# Patient Record
Sex: MALE | Race: WHITE | Employment: UNEMPLOYED | URBAN - METROPOLITAN AREA
[De-identification: names, ages, dates, MRNs, and addresses within clinical notes are randomized per-mention and may not be internally consistent; named-entity substitution may affect disease eponyms.]

---

## 2017-11-21 ENCOUNTER — HOSPITAL ENCOUNTER (OUTPATIENT)
Dept: GENERAL RADIOLOGY | Age: 82
Discharge: HOME | End: 2017-11-21
Attending: FAMILY MEDICINE | Admitting: FAMILY MEDICINE

## 2017-11-21 DIAGNOSIS — M54.6 CHRONIC MIDLINE THORACIC BACK PAIN: ICD-10-CM

## 2017-11-21 DIAGNOSIS — G89.29 CHRONIC MIDLINE THORACIC BACK PAIN: ICD-10-CM

## 2018-03-02 PROBLEM — K81.0 ACUTE CHOLECYSTITIS: Status: ACTIVE | Noted: 2018-03-02

## 2018-04-02 PROBLEM — Z91.81 RISK FOR FALLS: Status: ACTIVE | Noted: 2018-04-02

## 2018-04-02 PROBLEM — E86.0 DEHYDRATION WITH HYPONATREMIA: Status: ACTIVE | Noted: 2018-04-02

## 2018-04-02 PROBLEM — E87.1 DEHYDRATION WITH HYPONATREMIA: Status: ACTIVE | Noted: 2018-04-02

## 2018-04-02 PROBLEM — R63.0 ANOREXIA: Status: ACTIVE | Noted: 2018-04-02

## 2018-04-02 PROBLEM — T81.49XA POSTOPERATIVE ABSCESS: Status: ACTIVE | Noted: 2018-04-02

## 2018-04-03 PROBLEM — E43 SEVERE MALNUTRITION (HCC): Chronic | Status: ACTIVE | Noted: 2018-04-03

## 2019-07-07 ENCOUNTER — APPOINTMENT (OUTPATIENT)
Dept: CT IMAGING | Age: 84
End: 2019-07-07
Payer: MEDICAID

## 2019-07-07 ENCOUNTER — HOSPITAL ENCOUNTER (EMERGENCY)
Age: 84
Discharge: HOME | End: 2019-07-07
Payer: MEDICAID

## 2019-07-07 ENCOUNTER — APPOINTMENT (OUTPATIENT)
Dept: GENERAL RADIOLOGY | Age: 84
End: 2019-07-07
Payer: MEDICAID

## 2019-07-07 VITALS
OXYGEN SATURATION: 100 % | RESPIRATION RATE: 16 BRPM | HEART RATE: 78 BPM | DIASTOLIC BLOOD PRESSURE: 89 MMHG | TEMPERATURE: 98.8 F | SYSTOLIC BLOOD PRESSURE: 143 MMHG

## 2019-07-07 DIAGNOSIS — E16.2 HYPOGLYCEMIA: Primary | ICD-10-CM

## 2019-07-07 LAB
ALBUMIN SERPL-MCNC: 4.5 GM/DL (ref 3.4–5)
ALP BLD-CCNC: 68 IU/L (ref 40–129)
ALT SERPL-CCNC: 40 U/L (ref 10–40)
ANION GAP SERPL CALCULATED.3IONS-SCNC: 10 MMOL/L (ref 4–16)
AST SERPL-CCNC: 34 IU/L (ref 15–37)
BASOPHILS ABSOLUTE: 0 K/CU MM
BASOPHILS RELATIVE PERCENT: 0.4 % (ref 0–1)
BILIRUB SERPL-MCNC: 0.3 MG/DL (ref 0–1)
BUN BLDV-MCNC: 23 MG/DL (ref 6–23)
CALCIUM SERPL-MCNC: 9.9 MG/DL (ref 8.3–10.6)
CHLORIDE BLD-SCNC: 105 MMOL/L (ref 99–110)
CHP ED QC CHECK: YES
CO2: 27 MMOL/L (ref 21–32)
CREAT SERPL-MCNC: 1.1 MG/DL (ref 0.9–1.3)
DIFFERENTIAL TYPE: ABNORMAL
EOSINOPHILS ABSOLUTE: 0.4 K/CU MM
EOSINOPHILS RELATIVE PERCENT: 3.9 % (ref 0–3)
GFR AFRICAN AMERICAN: >60 ML/MIN/1.73M2
GFR NON-AFRICAN AMERICAN: >60 ML/MIN/1.73M2
GLUCOSE BLD-MCNC: 100 MG/DL (ref 70–99)
GLUCOSE BLD-MCNC: 123 MG/DL (ref 70–99)
GLUCOSE BLD-MCNC: 147 MG/DL (ref 70–99)
GLUCOSE BLD-MCNC: 51 MG/DL (ref 70–99)
GLUCOSE BLD-MCNC: 78 MG/DL
GLUCOSE BLD-MCNC: 78 MG/DL (ref 70–99)
HCT VFR BLD CALC: 39 % (ref 42–52)
HEMOGLOBIN: 13 GM/DL (ref 13.5–18)
IMMATURE NEUTROPHIL %: 0.4 % (ref 0–0.43)
LYMPHOCYTES ABSOLUTE: 2.6 K/CU MM
LYMPHOCYTES RELATIVE PERCENT: 28.2 % (ref 24–44)
MCH RBC QN AUTO: 31.6 PG (ref 27–31)
MCHC RBC AUTO-ENTMCNC: 33.3 % (ref 32–36)
MCV RBC AUTO: 94.7 FL (ref 78–100)
MONOCYTES ABSOLUTE: 0.8 K/CU MM
MONOCYTES RELATIVE PERCENT: 8.3 % (ref 0–4)
NUCLEATED RBC %: 0 %
PDW BLD-RTO: 12 % (ref 11.7–14.9)
PLATELET # BLD: 162 K/CU MM (ref 140–440)
PMV BLD AUTO: 9.6 FL (ref 7.5–11.1)
POTASSIUM SERPL-SCNC: 4.7 MMOL/L (ref 3.5–5.1)
PRO-BNP: 825.3 PG/ML
RBC # BLD: 4.12 M/CU MM (ref 4.6–6.2)
SEGMENTED NEUTROPHILS ABSOLUTE COUNT: 5.5 K/CU MM
SEGMENTED NEUTROPHILS RELATIVE PERCENT: 58.8 % (ref 36–66)
SODIUM BLD-SCNC: 142 MMOL/L (ref 135–145)
TOTAL IMMATURE NEUTOROPHIL: 0.04 K/CU MM
TOTAL NUCLEATED RBC: 0 K/CU MM
TOTAL PROTEIN: 7.7 GM/DL (ref 6.4–8.2)
TROPONIN T: <0.01 NG/ML
WBC # BLD: 9.4 K/CU MM (ref 4–10.5)

## 2019-07-07 PROCEDURE — 71045 X-RAY EXAM CHEST 1 VIEW: CPT

## 2019-07-07 PROCEDURE — 2580000003 HC RX 258

## 2019-07-07 PROCEDURE — 70450 CT HEAD/BRAIN W/O DYE: CPT

## 2019-07-07 PROCEDURE — 84484 ASSAY OF TROPONIN QUANT: CPT

## 2019-07-07 PROCEDURE — 83880 ASSAY OF NATRIURETIC PEPTIDE: CPT

## 2019-07-07 PROCEDURE — 82962 GLUCOSE BLOOD TEST: CPT

## 2019-07-07 PROCEDURE — 80053 COMPREHEN METABOLIC PANEL: CPT

## 2019-07-07 PROCEDURE — 85025 COMPLETE CBC W/AUTO DIFF WBC: CPT

## 2019-07-07 PROCEDURE — 99285 EMERGENCY DEPT VISIT HI MDM: CPT

## 2019-07-07 PROCEDURE — 93005 ELECTROCARDIOGRAM TRACING: CPT | Performed by: PHYSICIAN ASSISTANT

## 2019-07-07 RX ORDER — DEXTROSE MONOHYDRATE 25 G/50ML
INJECTION, SOLUTION INTRAVENOUS
Status: COMPLETED
Start: 2019-07-07 | End: 2019-07-07

## 2019-07-07 RX ADMIN — DEXTROSE 50 % IN WATER (D50W) INTRAVENOUS SYRINGE 50 ML: at 18:34

## 2019-07-07 NOTE — ED PROVIDER NOTES
Refill    carvedilol (COREG) 6.25 MG tablet Take 1 tablet by mouth 2 times daily (with meals) 60 tablet 3    insulin glargine (LANTUS) 100 UNIT/ML injection vial Inject 15 Units into the skin nightly 1 vial 0    insulin lispro (HUMALOG) 100 UNIT/ML injection vial Inject 0-18 Units into the skin 3 times daily (with meals) 1 vial 0    insulin lispro (HUMALOG) 100 UNIT/ML injection vial Inject 0-9 Units into the skin nightly 1 vial 0    FREESTYLE LITE strip   0    FREESTYLE LANCETS MISC TEST UP TO three times a day subcutaneously  0    polyethylene glycol (GLYCOLAX) powder take 17GM (DISSOLVED IN WATER) by mouth once daily if needed for constipation  0    risperiDONE (RISPERDAL) 0.25 MG tablet Take 0.25 mg by mouth nightly   0    sertraline (ZOLOFT) 25 MG tablet take 1 tablet by mouth once daily  0    acetaminophen (TYLENOL) 325 MG tablet Take 2 tablets by mouth every 6 hours as needed for Pain 120 tablet 3    docusate sodium (COLACE) 100 MG capsule Take 1 capsule by mouth 2 times daily as needed for Constipation 30 capsule 0    atorvastatin (LIPITOR) 10 MG tablet Take 1 tablet by mouth daily  0    FOLTABS 800 800- MCG-MG-MCG TABS Take 1 tablet by mouth daily  0    aspirin 81 MG chewable tablet Take 1 tablet by mouth daily 30 tablet 0    omeprazole (PRILOSEC) 20 MG capsule Take 20 mg by mouth Daily         ALLERGIES    No Known Allergies    FAMILY HISTORY    Family History   Problem Relation Age of Onset    Stroke Mother     Cancer Father     Cancer Sister        SOCIAL HISTORY    Social History     Socioeconomic History    Marital status:       Spouse name: None    Number of children: 0    Years of education: None    Highest education level: None   Occupational History    None   Social Needs    Financial resource strain: None    Food insecurity:     Worry: None     Inability: None    Transportation needs:     Medical: None     Non-medical: None   Tobacco Use    Smoking status: Abnormal; Notable for the following components:    Pro-.3 (*)     All other components within normal limits   POCT GLUCOSE - Abnormal; Notable for the following components:    POC Glucose 51 (*)     All other components within normal limits   POCT GLUCOSE - Abnormal; Notable for the following components:    POC Glucose 100 (*)     All other components within normal limits   POCT GLUCOSE - Abnormal; Notable for the following components:    POC Glucose 147 (*)     All other components within normal limits   POCT GLUCOSE - Normal   TROPONIN   POCT GLUCOSE     Ct Head Wo Contrast    Result Date: 7/7/2019  EXAMINATION: CT OF THE HEAD WITHOUT CONTRAST  7/7/2019 7:53 pm TECHNIQUE: CT of the head was performed without the administration of intravenous contrast. Dose modulation, iterative reconstruction, and/or weight based adjustment of the mA/kV was utilized to reduce the radiation dose to as low as reasonably achievable. COMPARISON: October 2016 HISTORY: ORDERING SYSTEM PROVIDED HISTORY: ams TECHNOLOGIST PROVIDED HISTORY: Has a \"code stroke\" or \"stroke alert\" been called? ->No Reason for Exam: low blood sugar FINDINGS: BRAIN/VENTRICLES: Ventricles and sulci are not grossly changed. There is no midline shift. Vascular calcifications are noted. Vascular tortuosity is noted. Minimal hazy low-density in the white matter is noted suggesting minimal small-vessel ischemic change. Otherwise, there is no focal area of abnormal density. There is no midline shift, hemorrhage or extra-axial collection ORBITS: No acute orbital abnormality is noted SINUSES: Minimal paranasal sinus mucosal thickening is noted SOFT TISSUES/SKULL:  No acute abnormality of the visualized skull or soft tissues. No acute intracranial abnormality. Xr Chest Portable    Result Date: 7/7/2019  EXAMINATION: ONE XRAY VIEW OF THE CHEST 7/7/2019 6:39 pm COMPARISON: Chest radiograph performed 04/02/2018.  HISTORY: ORDERING SYSTEM PROVIDED HISTORY:

## 2019-07-07 NOTE — ED NOTES
Bed: ED-34  Expected date:   Expected time:   Means of arrival:   Comments:  [address] [place], RN  07/07/19 9290

## 2019-07-08 PROCEDURE — 93010 ELECTROCARDIOGRAM REPORT: CPT | Performed by: INTERNAL MEDICINE

## 2019-07-11 LAB
EKG ATRIAL RATE: 60 BPM
EKG DIAGNOSIS: NORMAL
EKG P AXIS: 55 DEGREES
EKG P-R INTERVAL: 144 MS
EKG Q-T INTERVAL: 380 MS
EKG QRS DURATION: 90 MS
EKG QTC CALCULATION (BAZETT): 380 MS
EKG R AXIS: 3 DEGREES
EKG T AXIS: 71 DEGREES
EKG VENTRICULAR RATE: 60 BPM

## 2021-02-09 ENCOUNTER — APPOINTMENT (OUTPATIENT)
Dept: CT IMAGING | Age: 86
End: 2021-02-09
Payer: MEDICAID

## 2021-02-09 ENCOUNTER — APPOINTMENT (OUTPATIENT)
Dept: GENERAL RADIOLOGY | Age: 86
End: 2021-02-09
Payer: MEDICAID

## 2021-02-09 ENCOUNTER — HOSPITAL ENCOUNTER (EMERGENCY)
Age: 86
Discharge: NURSING, REHAB OR LONG-TERM CARE | End: 2021-02-09
Payer: MEDICAID

## 2021-02-09 VITALS
DIASTOLIC BLOOD PRESSURE: 66 MMHG | TEMPERATURE: 98.5 F | HEART RATE: 87 BPM | WEIGHT: 137 LBS | RESPIRATION RATE: 16 BRPM | SYSTOLIC BLOOD PRESSURE: 157 MMHG | HEIGHT: 60 IN | OXYGEN SATURATION: 96 % | BODY MASS INDEX: 26.9 KG/M2

## 2021-02-09 DIAGNOSIS — M79.601 BILATERAL ARM PAIN: ICD-10-CM

## 2021-02-09 DIAGNOSIS — S00.83XA FACIAL HEMATOMA, INITIAL ENCOUNTER: ICD-10-CM

## 2021-02-09 DIAGNOSIS — S00.12XA PERIORBITAL ECCHYMOSIS, LEFT, INITIAL ENCOUNTER: ICD-10-CM
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
## 2021-02-09 DIAGNOSIS — R03.0 ELEVATED BLOOD PRESSURE READING: ICD-10-CM

## 2021-02-09 DIAGNOSIS — M79.602 BILATERAL ARM PAIN: ICD-10-CM

## 2021-02-09 DIAGNOSIS — S09.90XA CLOSED HEAD INJURY, INITIAL ENCOUNTER: Primary | ICD-10-CM

## 2021-02-09 DIAGNOSIS — S00.81XA FACIAL ABRASION, INITIAL ENCOUNTER: ICD-10-CM

## 2021-02-09 PROCEDURE — 73090 X-RAY EXAM OF FOREARM: CPT

## 2021-02-09 PROCEDURE — 99285 EMERGENCY DEPT VISIT HI MDM: CPT

## 2021-02-09 PROCEDURE — 70450 CT HEAD/BRAIN W/O DYE: CPT

## 2021-02-09 PROCEDURE — 6370000000 HC RX 637 (ALT 250 FOR IP): Performed by: PHYSICIAN ASSISTANT

## 2021-02-09 PROCEDURE — 70486 CT MAXILLOFACIAL W/O DYE: CPT

## 2021-02-09 PROCEDURE — 72125 CT NECK SPINE W/O DYE: CPT

## 2021-02-09 PROCEDURE — 73060 X-RAY EXAM OF HUMERUS: CPT

## 2021-02-09 PROCEDURE — 6360000002 HC RX W HCPCS: Performed by: PHYSICIAN ASSISTANT

## 2021-02-09 PROCEDURE — 90715 TDAP VACCINE 7 YRS/> IM: CPT | Performed by: PHYSICIAN ASSISTANT

## 2021-02-09 PROCEDURE — 90471 IMMUNIZATION ADMIN: CPT | Performed by: PHYSICIAN ASSISTANT

## 2021-02-09 RX ORDER — DIAPER,BRIEF,INFANT-TODD,DISP
EACH MISCELLANEOUS ONCE
Status: COMPLETED | OUTPATIENT
Start: 2021-02-09 | End: 2021-02-09

## 2021-02-09 RX ORDER — ACETAMINOPHEN 500 MG
500 TABLET ORAL EVERY 6 HOURS PRN
Qty: 20 TABLET | Refills: 0 | Status: SHIPPED | OUTPATIENT
Start: 2021-02-09

## 2021-02-09 RX ORDER — IBUPROFEN 200 MG
TABLET ORAL
Qty: 1 TUBE | Refills: 0 | Status: SHIPPED | OUTPATIENT
Start: 2021-02-09 | End: 2021-02-09 | Stop reason: SDUPTHER

## 2021-02-09 RX ORDER — IBUPROFEN 200 MG
TABLET ORAL
Qty: 1 TUBE | Refills: 0 | Status: SHIPPED | OUTPATIENT
Start: 2021-02-09

## 2021-02-09 RX ORDER — ACETAMINOPHEN 500 MG
1000 TABLET ORAL ONCE
Status: COMPLETED | OUTPATIENT
Start: 2021-02-09 | End: 2021-02-09

## 2021-02-09 RX ADMIN — ACETAMINOPHEN 1000 MG: 500 TABLET ORAL at 21:50

## 2021-02-09 RX ADMIN — BACITRACIN ZINC 1 G: 500 OINTMENT TOPICAL at 21:50

## 2021-02-09 RX ADMIN — TETANUS TOXOID, REDUCED DIPHTHERIA TOXOID AND ACELLULAR PERTUSSIS VACCINE, ADSORBED 0.5 ML: 5; 2.5; 8; 8; 2.5 SUSPENSION INTRAMUSCULAR at 21:50

## 2021-02-09 ASSESSMENT — PAIN SCALES - GENERAL
PAINLEVEL_OUTOF10: 10
PAINLEVEL_OUTOF10: 6
PAINLEVEL_OUTOF10: 10

## 2021-02-09 ASSESSMENT — PAIN DESCRIPTION - LOCATION: LOCATION: ARM

## 2021-02-09 ASSESSMENT — PAIN DESCRIPTION - PROGRESSION: CLINICAL_PROGRESSION: NOT CHANGED

## 2021-02-09 ASSESSMENT — PAIN DESCRIPTION - PAIN TYPE: TYPE: ACUTE PAIN

## 2021-02-09 ASSESSMENT — PAIN DESCRIPTION - ORIENTATION: ORIENTATION: RIGHT;LEFT

## 2021-02-09 NOTE — ED PROVIDER NOTES
eMERGENCY dEPARTMENT eNCOUnter        [facility]    Chief Complaint   Patient presents with    Fall     fall from standing position, hit head on vending machine and then the floor.  Head Injury     hematoma to L forehead/brow    Trauma    Arm Pain     bilateral arm pain     This patient was not evaluated by the attending physician. I have independently evaluated this patient. My supervising physician was available for consultation. CRYSTAL Nieto is a 82 y.o. male who presents with left forehead pain and bilateral arm pain after fall. Onset was just prior to arrival.  The context (mechanism) is patient bent over to  his change from a vending machine when he hit his head on the vending machine as he bent over causing him to lose his balance and fall to the ground. He reports he fell landing on both his forearms and struck the left side of his head. Patient denies loss of consciousness. Patient was unable to get up after fall and was helped up with others immediately after the fall. Patient denies symptoms preceding fall. Patient has associated swelling, bruising, and abrasion of left supraorbital region. Patient reports diffuse bilateral arm pain with that is worse with palpation and movement. He denies any one area of his arms that hurt worse than others. Characteristic of pain is described as aching. The patient has no associated neck pain. Denies blood or clear fluid from ears, nose, mouth. Patient does take aspirin daily. Patient denies tetanus status being up-to-date. Patient denies EtOH or illicit drug use. Patient denies chest pain, back pain, leg pain, hip pain, abdominal pain, numbness, weakness, tingling, lightheadedness, dizziness, syncope, headache. REVIEW OF SYSTEMS    Constitutional:  Denies fever. Neurologic:    Denies confusion or memory loss. Denies light-headedness, dizziness. No extremity pain, sensory changes, or weakness.   Eyes:  Denies diplopia, blurred vision, or loss visual field. Denies discharge . Cardiac: No Chest Pain, palpitations, or Chest Injury  Respiratory:  Denies cough, wheezes, shortness of breath, respiratory discomfort   GI: No Abdominal pain or Abdominal Injury  Musculoskeletal:    See HPI. Skin:  + abrasion;  Denies rash   Lymphatic:  Denies swollen glands     All other review of systems are negative  See HPI and nursing notes for additional information     PAST MEDICAL AND SURGICAL HISTORY    Past Medical History:   Diagnosis Date    Blood circulation, collateral     CAD (coronary artery disease)     S/p CABG    CHF (congestive heart failure) (HCC)     Chronic systolic heart failure (HCC)     [facility] [address]     Diabetes type 2, controlled ([facility] [address] 7/14/2015    Hypertension     Pneumonia     Severe malnutrition (HCC)      Past Surgical History:   Procedure Laterality Date    CARDIAC SURGERY      CABGx4 7-5-15    CHOLECYSTECTOMY, LAPAROSCOPIC N/A 03/03/2018    ERCP  10/26/2016       CURRENT MEDICATIONS    Current Outpatient Rx   Medication Sig Dispense Refill    neomycin-bacitracin-polymyxin (NEOSPORIN) 5-400-5000 ointment Apply topically 2 times daily to wound until healed.  1 Tube 0    acetaminophen (APAP EXTRA STRENGTH) 500 MG tablet Take 1 tablet by mouth every 6 hours as needed for Pain 20 tablet 0    carvedilol (COREG) 6.25 MG tablet Take 1 tablet by mouth 2 times daily (with meals) 60 tablet 3    insulin glargine (LANTUS) 100 UNIT/ML injection vial Inject 15 Units into the skin nightly 1 vial 0    insulin lispro (HUMALOG) 100 UNIT/ML injection vial Inject 0-18 Units into the skin 3 times daily (with meals) 1 vial 0    insulin lispro (HUMALOG) 100 UNIT/ML injection vial Inject 0-9 Units into the skin nightly 1 vial 0    FREESTYLE LITE strip   0    FREESTYLE LANCETS MISC TEST UP TO three times a day subcutaneously  0    polyethylene glycol (GLYCOLAX) powder take 17GM (DISSOLVED IN WATER) by abused: Not on file     Forced sexual activity: Not on file   Other Topics Concern    Not on file   Social History Narrative    Not on file     Family History   Problem Relation Age of Onset    Stroke Mother     Cancer Father     Cancer Sister          PHYSICAL EXAM    VITAL SIGNS: BP (!) 150/117   Pulse 69   Temp 98.4 °F (36.9 °C) (Oral)   Resp 17   Ht 4' 8\" (1.422 m)   Wt 137 lb (62.1 kg)   SpO2 97%   BMI 30.71 kg/m²      Constitutional:  Well developed, well nourished, no acute distress. Scalp: No swelling, discoloration. Skin intact    Neck / back:  No JVD. No swelling or discoloration on inspection. No posterior midline neck tenderness. Full range of motion without pain. No swelling, discoloration, or tenderness/palpable defect of remaining back exam.  No midline CTL spine tenderness palpation or palpable defect. HENT:   - PERRL. EOMI. No obvious eyeball trauma, hyphema, hemorrhage, or conjunctival injection. Eyelids intact without obvious trauma.  -There is superficial abrasion of inferior left frontal region as well as hematoma of the left periorbital and left frontal region.  - External auditory canals and TMs clear  - Oral cavity without injury. Dentition intact without obvious injury. Oropharynx clear. No trismus    -Nasal passages clear without blood, clear fluid, mass, septal mass/hematoma. Nose is without obvious deformity, tenderness, or palpable defect. - There is tenderness to palpation of the left periorbital region. Palpation of the remainder of facial bones including right orbit, maxilla and mandible reveals no focal tenderness or palpable defect, crepitus. No midface instability. Able to fully open and close jaw without pain or TMJ tenderness.      - No Hurley sign, no raccoon sign. Cardiovascular:    Reg rate, no murmurs. Inspection of chest wall reveals no discoloration or swelling. There is no focal tenderness or palpable defect.   Respiratory: Nonlabored breathing. Lungs Clear, no retractions   GI:    No discoloration or swelling. Soft, nontender, normal bowel sounds    Musculoskeletal:    No edema, no acute deformities. Full range of motion of bilateral lower extremities without obvious deficit , pain, tenderness to palpation. No hip tenderness palpation or crepitus. Bilateral shoulders without gross deformity, swelling, discoloration, range of motion deficit, tenderness to palpation. There is mild diffuse tenderness to palpation of the humerus, radius, ulna, hands without point tenderness present. Patient has full active range of motion of all major joints of bilateral upper extremities and full active range of motion of elbows, wrists, joints of fingers. Bilateral upper extremities are distally neurovascularly intact. No gross deformity, swelling, discoloration of the bilateral upper extremities. Integument:    Approximately 2 cm abrasion present of left inferior frontal region that is superficial does not require repair. No foreign bodies present. No laceration. There is a hematoma present of the left periorbital and frontal region. No rash. Neurologic:    - Alert & oriented person, place, time, and situation, no speech difficulties or slurring.  - No obvious gross motor deficits  - Cranial nerves 2-12 grossly intact  - Sensation intact to light touch  - Strength 5/5 in upper and lower extremities bilaterally  - Normal finger to nose test bilaterally  - Rapid alternating movements intact  - Normal heel-shin bilaterally  - No pronator drift. - Light touch sensation intact throughout. - Upper and lower extremity DTRs 2+ bilaterally. - No truncal ataxia    Psych:  Cooperative, no abnormal behavior or hallucinations        Imaging:  CT CERVICAL SPINE WO CONTRAST   Final Result   No acute intracranial abnormality. No acute fracture or dislocation in the facial and cranial bones.       Left periorbital and frontal soft with intact bilateral globes. There is no fracture or subluxation of the cervical spine. No acute fracture or dislocation in the facial and cranial bones. No acute intracranial abnormality. No acute osseous abnormality to bilateral upper extremities. Patient neurologically intact on examination. Wound cleansed with Hibiclens and saline, bacitracin applied, and wound dressing applied in the ED. Patient treated with Tylenol for pain. Patient received prescriptions for triple antibiotic ointment and Tylenol-we discussed medication. Patient is nontoxic appearing. Vital signs are stable-initially elevated blood pressure did improve after treatment of pain. Patient stable for outpatient management and comfortable with discharge at this time. All pertinent Lab data and radiographic results reviewed with patient at bedside. The patient and/or the family were informed of the results of any tests/labs/imaging, the treatment plan, and time was allotted to answer questions. Diagnosis, disposition, and plan reviewed with patient who understands and agrees. Patient is comfortable with discharge at this time. Patient understands and agrees to follow-up with PCP in the next 2-3 days for recheck. Patient understands and agrees to return to emergency department for any new or worsening symptoms including but not limited to worsening pain or swelling, vision changes, and focal neurologic symptoms (discussed today with patient). Clinical  IMPRESSION    1. Closed head injury, initial encounter    2. Fall, initial encounter    3. Facial hematoma, initial encounter    4. Periorbital ecchymosis, left, initial encounter    5. Bilateral arm pain    6. Facial abrasion, initial encounter    7. Elevated blood pressure reading        Disposition referral (if applicable): Rosalba Jones MD  [address] [place] [address]  [phone]    Call today  Recheck in 2-3 days    [facility] [address] Emergency Department  [place] [facility] [phone]  [phone]  Go to   Return to ED if symptoms worsen or new symptoms      Disposition medications (if applicable):  New Prescriptions    ACETAMINOPHEN (APAP EXTRA STRENGTH) 500 MG TABLET    Take 1 tablet by mouth every 6 hours as needed for Pain    NEOMYCIN-BACITRACIN-POLYMYXIN (NEOSPORIN) 5-400-5000 OINTMENT    Apply topically 2 times daily to wound until healed. Comment: Please note this report has been produced using speech recognition software and may contain errors related to that system including errors in grammar, punctuation, and spelling, as well as words and phrases that may be inappropriate. If there are any questions or concerns please feel free to contact the dictating provider for clarification.                   Flora Oleary PA-C  02/10/21 6108

## 2021-02-09 NOTE — ED NOTES
Dr. Willett Alert notified of trauma alert.       [facility], RN  02/09/21 4806

## 2021-02-10 NOTE — ED NOTES
Notified [facility] of patients estimated arrival time.      Piedad Adams  02/09/21 6501

## 2021-02-10 NOTE — ED NOTES
Report received from [address] [place] [address] at bedside and placed on telemetry monitoring at this time.      [facility]  02/09/21 [id number]

## 2021-02-10 NOTE — ED NOTES
Pt. reviewed discharge instructions, follow up instructions, and new medications. Pt.  given printed prescriptions. Pt. verbalizes understanding with no further questions. Pt. ambulatory and not showing any signs of distress. Pt. Gave this nurse verbal consent to sign paperwork. Pt. Escorted out in stretcher by [facility].  Nursing home is aware of his estimated arrival.       Shi Cody  02/09/21 9244